# Patient Record
Sex: FEMALE
[De-identification: names, ages, dates, MRNs, and addresses within clinical notes are randomized per-mention and may not be internally consistent; named-entity substitution may affect disease eponyms.]

---

## 2018-09-27 ENCOUNTER — APPOINTMENT (OUTPATIENT)
Dept: OPHTHALMOLOGY | Facility: CLINIC | Age: 53
End: 2018-09-27
Payer: COMMERCIAL

## 2018-09-27 PROCEDURE — 92004 COMPRE OPH EXAM NEW PT 1/>: CPT

## 2018-09-27 PROCEDURE — 92225: CPT | Mod: LT

## 2020-01-07 ENCOUNTER — APPOINTMENT (OUTPATIENT)
Dept: OPHTHALMOLOGY | Facility: CLINIC | Age: 55
End: 2020-01-07

## 2021-09-29 ENCOUNTER — APPOINTMENT (OUTPATIENT)
Dept: OPHTHALMOLOGY | Facility: CLINIC | Age: 56
End: 2021-09-29

## 2022-02-28 ENCOUNTER — NON-APPOINTMENT (OUTPATIENT)
Age: 57
End: 2022-02-28

## 2022-02-28 ENCOUNTER — APPOINTMENT (OUTPATIENT)
Dept: OPHTHALMOLOGY | Facility: CLINIC | Age: 57
End: 2022-02-28
Payer: COMMERCIAL

## 2022-02-28 PROCEDURE — 92004 COMPRE OPH EXAM NEW PT 1/>: CPT

## 2022-02-28 PROCEDURE — 92250 FUNDUS PHOTOGRAPHY W/I&R: CPT

## 2022-05-16 ENCOUNTER — NON-APPOINTMENT (OUTPATIENT)
Age: 57
End: 2022-05-16

## 2022-05-16 ENCOUNTER — APPOINTMENT (OUTPATIENT)
Dept: OPHTHALMOLOGY | Facility: CLINIC | Age: 57
End: 2022-05-16
Payer: COMMERCIAL

## 2022-05-16 PROCEDURE — 92134 CPTRZ OPH DX IMG PST SGM RTA: CPT

## 2022-05-16 PROCEDURE — 92083 EXTENDED VISUAL FIELD XM: CPT

## 2022-05-16 PROCEDURE — 92012 INTRM OPH EXAM EST PATIENT: CPT

## 2024-02-22 ENCOUNTER — APPOINTMENT (OUTPATIENT)
Dept: SURGERY | Facility: CLINIC | Age: 59
End: 2024-02-22
Payer: COMMERCIAL

## 2024-02-22 VITALS
SYSTOLIC BLOOD PRESSURE: 146 MMHG | BODY MASS INDEX: 30.39 KG/M2 | HEIGHT: 64 IN | DIASTOLIC BLOOD PRESSURE: 92 MMHG | HEART RATE: 100 BPM | WEIGHT: 178 LBS

## 2024-02-22 DIAGNOSIS — Z80.8 FAMILY HISTORY OF MALIGNANT NEOPLASM OF OTHER ORGANS OR SYSTEMS: ICD-10-CM

## 2024-02-22 DIAGNOSIS — Z78.9 OTHER SPECIFIED HEALTH STATUS: ICD-10-CM

## 2024-02-22 DIAGNOSIS — Z86.79 PERSONAL HISTORY OF OTHER DISEASES OF THE CIRCULATORY SYSTEM: ICD-10-CM

## 2024-02-22 DIAGNOSIS — Z80.0 FAMILY HISTORY OF MALIGNANT NEOPLASM OF DIGESTIVE ORGANS: ICD-10-CM

## 2024-02-22 DIAGNOSIS — Z86.39 PERSONAL HISTORY OF OTHER ENDOCRINE, NUTRITIONAL AND METABOLIC DISEASE: ICD-10-CM

## 2024-02-22 PROCEDURE — 31575 DIAGNOSTIC LARYNGOSCOPY: CPT

## 2024-02-22 PROCEDURE — 99204 OFFICE O/P NEW MOD 45 MIN: CPT | Mod: 25

## 2024-02-22 RX ORDER — VENLAFAXINE 37.5 MG/1
TABLET ORAL
Refills: 0 | Status: ACTIVE | COMMUNITY

## 2024-02-22 RX ORDER — METOPROLOL TARTRATE 75 MG/1
TABLET, FILM COATED ORAL
Refills: 0 | Status: ACTIVE | COMMUNITY

## 2024-02-22 RX ORDER — SITAGLIPTIN AND METFORMIN HYDROCHLORIDE 50; 1000 MG/1; MG/1
50-1000 TABLET, FILM COATED ORAL
Refills: 0 | Status: ACTIVE | COMMUNITY

## 2024-02-22 RX ORDER — BUPROPION HYDROCHLORIDE 75 MG/1
TABLET, FILM COATED ORAL
Refills: 0 | Status: ACTIVE | COMMUNITY

## 2024-02-24 NOTE — CONSULT LETTER
[Dear  ___] : Dear  [unfilled], [Consult Letter:] : I had the pleasure of evaluating your patient, [unfilled]. [Please see my note below.] : Please see my note below. [Consult Closing:] : Thank you very much for allowing me to participate in the care of this patient.  If you have any questions, please do not hesitate to contact me. [Sincerely,] : Sincerely, [FreeTextEntry2] : Dr. Shravan Shah, Dr. Christopher Hdz [FreeTextEntry3] : Speedy Mann MD, FACS System Director, Endocrine Surgery [facility] Associate  Professor of Surgery [facility] [employer] at [facility] [DrNina  ___] : Dr. VILLA

## 2024-02-24 NOTE — PHYSICAL EXAM
[de-identified] : 6 cm left thyroid nodule, well circumscribed and mobile [Nasal Endoscopy Performed] : nasal endoscopy was performed, see procedure section for findings [Laryngoscopy Performed] : laryngoscopy was performed, see procedure section for findings [R] : deviated to the right [Normal] : orientation to person, place, and time: normal [de-identified] : fiberoptic laryngoscopy shows normal vocal cord mobility bilaterally with subglottic narrowing noted

## 2024-02-24 NOTE — ASSESSMENT
[FreeTextEntry1] : lengthy discussion regarding options for management including active surveillance  vs thyroid lobectomy with possible paratracheal node dissection, with or without frozen section vs total thyroidectomy with possible paratracheal node dissection.  risks, benefits and alternatives discussed at length.  I have discussed with the patient the anatomy of the area, the pathophysiology of the disease process and the rationale for surgery.  The attendant risks, possible complications and expected postoperative course have been discussed in detail.  I have given the patient the opportunity to ask questions, and all questions have been answered to the patient's satisfaction.  CT requested to assess tracheal narrowing and substernal extension.  will probably require lobectomy, possible total thyroidectomy, inpatient at [facility] with recurrent nerve monitoring. PCP has requested cardiac clearance. to call next week for results.

## 2024-02-24 NOTE — HISTORY OF PRESENT ILLNESS
[de-identified] : Pt c/o goiter for many years, increasing in size with occasional hoarseness. denies dysphagia, SOB or RT exposure sonogram: left 5.72  cm thyroid nodule FNA (2018) - Benign normal TFTs, calcium 9.5, vitamin D 35 I have reviewed all old and new data and available images.

## 2024-02-29 ENCOUNTER — OUTPATIENT (OUTPATIENT)
Dept: OUTPATIENT SERVICES | Facility: HOSPITAL | Age: 59
LOS: 1 days | End: 2024-02-29
Payer: COMMERCIAL

## 2024-02-29 ENCOUNTER — APPOINTMENT (OUTPATIENT)
Dept: CT IMAGING | Facility: CLINIC | Age: 59
End: 2024-02-29
Payer: COMMERCIAL

## 2024-02-29 DIAGNOSIS — E04.9 NONTOXIC GOITER, UNSPECIFIED: ICD-10-CM

## 2024-02-29 PROCEDURE — 70491 CT SOFT TISSUE NECK W/DYE: CPT

## 2024-02-29 PROCEDURE — 70491 CT SOFT TISSUE NECK W/DYE: CPT | Mod: 26

## 2024-03-05 DIAGNOSIS — E04.9 NONTOXIC GOITER, UNSPECIFIED: ICD-10-CM

## 2024-09-10 ENCOUNTER — APPOINTMENT (OUTPATIENT)
Dept: SURGERY | Facility: CLINIC | Age: 59
End: 2024-09-10
Payer: COMMERCIAL

## 2024-09-10 DIAGNOSIS — E04.9 NONTOXIC GOITER, UNSPECIFIED: ICD-10-CM

## 2024-09-10 PROCEDURE — 36415 COLL VENOUS BLD VENIPUNCTURE: CPT

## 2024-09-10 PROCEDURE — 99214 OFFICE O/P EST MOD 30 MIN: CPT | Mod: 25

## 2024-09-10 NOTE — ASSESSMENT
[FreeTextEntry1] : lengthy discussion regarding options for management including active surveillance  vs thyroid lobectomy with possible paratracheal node dissection, with or without frozen section vs total thyroidectomy with possible paratracheal node dissection.  risks, benefits and alternatives discussed at length.  I have discussed with the patient the anatomy of the area, the pathophysiology of the disease process and the rationale for surgery.  The attendant risks, possible complications and expected postoperative course have been discussed in detail.  I have given the patient the opportunity to ask questions, and all questions have been answered to the patient's satisfaction.  to be scheduled for substernal lobectomy, possible total thyroidectomy, inpatient at [facility] with recurrent nerve monitoring. PCP has requested cardiac clearance. to call next week for results.

## 2024-09-10 NOTE — PHYSICAL EXAM
[de-identified] : 6 cm left thyroid nodule, well circumscribed and mobile [Laryngoscopy Performed] : laryngoscopy was performed, see procedure section for findings [R] : deviated to the right [Normal] : orientation to person, place, and time: normal

## 2024-09-10 NOTE — HISTORY OF PRESENT ILLNESS
[de-identified] : prior evaluation of thyroid nodule. cytologically benign. substernal extension and tracheal compression. previously recommended surgery, but deferred until today. no changes medically since last visit. recent sonogram essentially stable. I have reviewed all old and new data and available images.

## 2024-09-11 LAB
T3 SERPL-MCNC: 111 NG/DL
T4 FREE SERPL-MCNC: 1 NG/DL
THYROGLOB AB SERPL-ACNC: 15.6 IU/ML
THYROPEROXIDASE AB SERPL IA-ACNC: 11 IU/ML
TSH SERPL-ACNC: 0.91 UIU/ML

## 2024-10-07 ENCOUNTER — OUTPATIENT (OUTPATIENT)
Dept: OUTPATIENT SERVICES | Facility: HOSPITAL | Age: 59
LOS: 1 days | End: 2024-10-07

## 2024-10-07 VITALS
SYSTOLIC BLOOD PRESSURE: 132 MMHG | HEIGHT: 63 IN | RESPIRATION RATE: 16 BRPM | TEMPERATURE: 98 F | WEIGHT: 173.94 LBS | HEART RATE: 85 BPM | DIASTOLIC BLOOD PRESSURE: 84 MMHG | OXYGEN SATURATION: 96 %

## 2024-10-07 DIAGNOSIS — E11.9 TYPE 2 DIABETES MELLITUS WITHOUT COMPLICATIONS: ICD-10-CM

## 2024-10-07 DIAGNOSIS — E04.9 NONTOXIC GOITER, UNSPECIFIED: ICD-10-CM

## 2024-10-07 DIAGNOSIS — F41.9 ANXIETY DISORDER, UNSPECIFIED: ICD-10-CM

## 2024-10-07 DIAGNOSIS — Z86.39 PERSONAL HISTORY OF OTHER ENDOCRINE, NUTRITIONAL AND METABOLIC DISEASE: ICD-10-CM

## 2024-10-07 DIAGNOSIS — Z98.890 OTHER SPECIFIED POSTPROCEDURAL STATES: Chronic | ICD-10-CM

## 2024-10-07 DIAGNOSIS — I10 ESSENTIAL (PRIMARY) HYPERTENSION: ICD-10-CM

## 2024-10-07 DIAGNOSIS — Z90.11 ACQUIRED ABSENCE OF RIGHT BREAST AND NIPPLE: Chronic | ICD-10-CM

## 2024-10-07 LAB
A1C WITH ESTIMATED AVERAGE GLUCOSE RESULT: 8.4 % — HIGH (ref 4–5.6)
BLD GP AB SCN SERPL QL: NEGATIVE — SIGNIFICANT CHANGE UP
ESTIMATED AVERAGE GLUCOSE: 194 — SIGNIFICANT CHANGE UP
RH IG SCN BLD-IMP: POSITIVE — SIGNIFICANT CHANGE UP
RH IG SCN BLD-IMP: POSITIVE — SIGNIFICANT CHANGE UP

## 2024-10-07 RX ORDER — SODIUM CHLORIDE IRRIG SOLUTION 0.9 %
1000 SOLUTION, IRRIGATION IRRIGATION
Refills: 0 | Status: DISCONTINUED | OUTPATIENT
Start: 2024-10-14 | End: 2024-10-14

## 2024-10-07 NOTE — H&P PST ADULT - PROBLEM SELECTOR PLAN 1
scheduled for left thyroid substernal lobectomy, possible total thyroidectomy, recurrent nerve monitoring with Dr. Mann on 10/14/2024.   Verbal and written pre-op instructions provided to patient. Patient verbalized understanding and will call surgeons office for revised instructions if surgery is rescheduled.  Pepcid for GI prophylaxis provided.   patient given verbal and written instruction with teach back on chlorhexidine shampoo, and the patient verbalized understanding with return demonstration.

## 2024-10-07 NOTE — H&P PST ADULT - PROBLEM SELECTOR PLAN 2
Pt. instructed to hold Janumet  the morning of procedure. Accucheck DOS.   Last dose of Jardiance on 10/10/24.  Patient will obtain medical clearance as per surgeons request-copy requested for A1c of 8 Pt. instructed to hold Janumet  the morning of procedure. Accucheck DOS.   Last dose of Jardiance on 10/10/24. Pt. instructed to hold Janumet  the morning of procedure. Accucheck DOS.   Last dose of Jardiance on 10/10/24.  Surgeon notified via email of last A1c 8.

## 2024-10-07 NOTE — H&P PST ADULT - NSICDXPASTMEDICALHX_GEN_ALL_CORE_FT
PAST MEDICAL HISTORY:  Depression     Hyperlipidemia     Hypertension     Type 2 diabetes mellitus

## 2024-10-07 NOTE — H&P PST ADULT - ASSESSMENT
patient 58 yo female presents to PST unit with pre-op diagnosis of nontoxic goiter scheduled for left thyroid substernal lobectomy, possible total thyroidectomy, recurrent nerve monitoring with Dr. Mann.

## 2024-10-07 NOTE — H&P PST ADULT - ENMT COMMENTS
large goiter large goiter for several years large goiter, mallampati: II large goiter for several years, denies dentures and  loose teeth large goiter, Mallampati: II

## 2024-10-07 NOTE — H&P PST ADULT - HISTORY OF PRESENT ILLNESS
60 yo female presents to PST unit with pre-op diagnosis of nontoxic goiter scheduled for left thyroid substernal lobectomy, possible total thyroidectomy, recurrent nerve monitoring with Dr. Mann. Pt. reports enlarging goiter over the past several years.

## 2024-10-07 NOTE — H&P PST ADULT - PRO ARRIVE FROM
Patient feeling much better discussed supportive management of hydrations and motrin/tylenol use. Advised follow up with PMD and discussed strict return precautions.
home

## 2024-10-08 LAB
BASOPHILS # BLD AUTO: 0.07 K/UL — SIGNIFICANT CHANGE UP (ref 0–0.2)
BASOPHILS NFR BLD AUTO: 0.6 % — SIGNIFICANT CHANGE UP (ref 0–2)
EOSINOPHIL # BLD AUTO: 0.15 K/UL — SIGNIFICANT CHANGE UP (ref 0–0.5)
EOSINOPHIL NFR BLD AUTO: 1.4 % — SIGNIFICANT CHANGE UP (ref 0–6)
HCT VFR BLD CALC: 41.4 % — SIGNIFICANT CHANGE UP (ref 34.5–45)
HGB BLD-MCNC: 13.3 G/DL — SIGNIFICANT CHANGE UP (ref 11.5–15.5)
IMM GRANULOCYTES NFR BLD AUTO: 0.4 % — SIGNIFICANT CHANGE UP (ref 0–0.9)
LYMPHOCYTES # BLD AUTO: 2.8 K/UL — SIGNIFICANT CHANGE UP (ref 1–3.3)
LYMPHOCYTES # BLD AUTO: 26 % — SIGNIFICANT CHANGE UP (ref 13–44)
MCHC RBC-ENTMCNC: 26.2 PG — LOW (ref 27–34)
MCHC RBC-ENTMCNC: 32.1 GM/DL — SIGNIFICANT CHANGE UP (ref 32–36)
MCV RBC AUTO: 81.5 FL — SIGNIFICANT CHANGE UP (ref 80–100)
MONOCYTES # BLD AUTO: 0.98 K/UL — HIGH (ref 0–0.9)
MONOCYTES NFR BLD AUTO: 9.1 % — SIGNIFICANT CHANGE UP (ref 2–14)
NEUTROPHILS # BLD AUTO: 6.73 K/UL — SIGNIFICANT CHANGE UP (ref 1.8–7.4)
NEUTROPHILS NFR BLD AUTO: 62.5 % — SIGNIFICANT CHANGE UP (ref 43–77)
PLATELET # BLD AUTO: 250 K/UL — SIGNIFICANT CHANGE UP (ref 150–400)
RBC # BLD: 5.08 M/UL — SIGNIFICANT CHANGE UP (ref 3.8–5.2)
RBC # FLD: 15 % — HIGH (ref 10.3–14.5)
WBC # BLD: 10.77 K/UL — HIGH (ref 3.8–10.5)
WBC # FLD AUTO: 10.77 K/UL — HIGH (ref 3.8–10.5)

## 2024-10-11 NOTE — ASU PATIENT PROFILE, ADULT - FALL HARM RISK - UNIVERSAL INTERVENTIONS
Bed in lowest position, wheels locked, appropriate side rails in place/Call bell, personal items and telephone in reach/Instruct patient to call for assistance before getting out of bed or chair/Non-slip footwear when patient is out of bed/[place] to call system/Physically safe environment - no spills, clutter or unnecessary equipment/Purposeful Proactive Rounding/Room/bathroom lighting operational, light cord in reach

## 2024-10-11 NOTE — ASU PATIENT PROFILE, ADULT - PATIENT'S SEXUAL ORIENTATION
Problem: Patient Care Overview  Goal: Plan of Care Review  Outcome: Ongoing (interventions implemented as appropriate)  VSS. Pain controlled with PRN and scheduled oral pain medication. Pt wearing abdominal binder. RN providing heat packs for pt's lower back soreness near epidural site. Incision c/d/i. No dilaudid administered this shift. Pt able to sleep during shift after administration of 10 mg ambien PO. RN emphasized importance for pt to walk around unit to decrease muscle stiffness and pain. Pt verbalizes understanding, but only walked from bed to bathroom and back during shift. Pt safety maintained. Will continue to monitor.        Heterosexual

## 2024-10-14 ENCOUNTER — TRANSCRIPTION ENCOUNTER (OUTPATIENT)
Age: 59
End: 2024-10-14

## 2024-10-14 ENCOUNTER — RESULT REVIEW (OUTPATIENT)
Age: 59
End: 2024-10-14

## 2024-10-14 ENCOUNTER — APPOINTMENT (OUTPATIENT)
Dept: SURGERY | Facility: HOSPITAL | Age: 59
End: 2024-10-14
Payer: COMMERCIAL

## 2024-10-14 ENCOUNTER — INPATIENT (INPATIENT)
Facility: HOSPITAL | Age: 59
LOS: 0 days | Discharge: HOME | End: 2024-10-15
Attending: SPECIALIST | Admitting: SPECIALIST
Payer: COMMERCIAL

## 2024-10-14 VITALS
RESPIRATION RATE: 16 BRPM | SYSTOLIC BLOOD PRESSURE: 137 MMHG | HEIGHT: 63 IN | TEMPERATURE: 97 F | OXYGEN SATURATION: 98 % | WEIGHT: 173.94 LBS | DIASTOLIC BLOOD PRESSURE: 91 MMHG | HEART RATE: 83 BPM

## 2024-10-14 DIAGNOSIS — E04.9 NONTOXIC GOITER, UNSPECIFIED: ICD-10-CM

## 2024-10-14 DIAGNOSIS — Z90.11 ACQUIRED ABSENCE OF RIGHT BREAST AND NIPPLE: Chronic | ICD-10-CM

## 2024-10-14 DIAGNOSIS — Z98.890 OTHER SPECIFIED POSTPROCEDURAL STATES: Chronic | ICD-10-CM

## 2024-10-14 LAB
GLUCOSE BLDC GLUCOMTR-MCNC: 273 MG/DL — HIGH (ref 70–99)
GLUCOSE BLDC GLUCOMTR-MCNC: 288 MG/DL — HIGH (ref 70–99)
GLUCOSE BLDC GLUCOMTR-MCNC: 337 MG/DL — HIGH (ref 70–99)
GLUCOSE BLDC GLUCOMTR-MCNC: 347 MG/DL — HIGH (ref 70–99)

## 2024-10-14 PROCEDURE — 88334 PATH CONSLTJ SURG CYTO XM EA: CPT | Mod: 26,59

## 2024-10-14 PROCEDURE — 88307 TISSUE EXAM BY PATHOLOGIST: CPT | Mod: 26

## 2024-10-14 PROCEDURE — 60271 REMOVAL OF THYROID: CPT

## 2024-10-14 PROCEDURE — 88331 PATH CONSLTJ SURG 1 BLK 1SPC: CPT | Mod: 26

## 2024-10-14 PROCEDURE — 13132 CMPLX RPR F/C/C/M/N/AX/G/H/F: CPT | Mod: 59

## 2024-10-14 DEVICE — LIGATING CLIPS WECK HORIZON LARGE (ORANGE) 24: Type: IMPLANTABLE DEVICE | Status: FUNCTIONAL

## 2024-10-14 DEVICE — ENDOTRACHEAL TUBE ENT KIT 30FR 7MM ID - 10MM OD: Type: IMPLANTABLE DEVICE | Status: FUNCTIONAL

## 2024-10-14 DEVICE — LIGATING CLIPS WECK HORIZON MEDIUM (BLUE) 24: Type: IMPLANTABLE DEVICE | Status: FUNCTIONAL

## 2024-10-14 DEVICE — LIGATING CLIPS WECK HORIZON SMALL-WIDE (RED) 24: Type: IMPLANTABLE DEVICE | Status: FUNCTIONAL

## 2024-10-14 DEVICE — SURGICEL FIBRILLAR 2 X 4": Type: IMPLANTABLE DEVICE | Status: FUNCTIONAL

## 2024-10-14 RX ORDER — METFORMIN HCL 500 MG
500 TABLET ORAL ONCE
Refills: 0 | Status: COMPLETED | OUTPATIENT
Start: 2024-10-15 | End: 2024-10-15

## 2024-10-14 RX ORDER — INSULIN LISPRO 100/ML
VIAL (ML) SUBCUTANEOUS
Refills: 0 | Status: DISCONTINUED | OUTPATIENT
Start: 2024-10-14 | End: 2024-10-15

## 2024-10-14 RX ORDER — VENLAFAXINE HCL 75 MG
150 TABLET ORAL DAILY
Refills: 0 | Status: DISCONTINUED | OUTPATIENT
Start: 2024-10-14 | End: 2024-10-14

## 2024-10-14 RX ORDER — LINAGLIPTIN 5 MG/1
5 TABLET, FILM COATED ORAL DAILY
Refills: 0 | Status: DISCONTINUED | OUTPATIENT
Start: 2024-10-14 | End: 2024-10-15

## 2024-10-14 RX ORDER — SITAGLIPTIN AND METFORMIN HYDROCHLORIDE 500; 50 MG/1; MG/1
1 TABLET, FILM COATED ORAL
Refills: 0 | DISCHARGE

## 2024-10-14 RX ORDER — ACETAMINOPHEN 325 MG
2 TABLET ORAL
Qty: 0 | Refills: 0 | DISCHARGE
Start: 2024-10-14

## 2024-10-14 RX ORDER — FENTANYL CITRATE-0.9 % NACL/PF 300MCG/30
50 PATIENT CONTROLLED ANALGESIA VIAL INJECTION
Refills: 0 | Status: DISCONTINUED | OUTPATIENT
Start: 2024-10-14 | End: 2024-10-14

## 2024-10-14 RX ORDER — EMPAGLIFLOZIN 25 MG/1
1 TABLET, FILM COATED ORAL
Refills: 0 | DISCHARGE

## 2024-10-14 RX ORDER — LINAGLIPTIN 5 MG/1
5 TABLET, FILM COATED ORAL DAILY
Refills: 0 | Status: DISCONTINUED | OUTPATIENT
Start: 2024-10-14 | End: 2024-10-14

## 2024-10-14 RX ORDER — SODIUM CHLORIDE IRRIG SOLUTION 0.9 %
1000 SOLUTION, IRRIGATION IRRIGATION
Refills: 0 | Status: DISCONTINUED | OUTPATIENT
Start: 2024-10-14 | End: 2024-10-15

## 2024-10-14 RX ORDER — ALCOHOL ANTISEPTIC PADS
15 PADS, MEDICATED (EA) TOPICAL ONCE
Refills: 0 | Status: DISCONTINUED | OUTPATIENT
Start: 2024-10-14 | End: 2024-10-15

## 2024-10-14 RX ORDER — ATORVASTATIN CALCIUM 10 MG/1
10 TABLET, FILM COATED ORAL AT BEDTIME
Refills: 0 | Status: DISCONTINUED | OUTPATIENT
Start: 2024-10-14 | End: 2024-10-14

## 2024-10-14 RX ORDER — ATORVASTATIN CALCIUM 10 MG/1
10 TABLET, FILM COATED ORAL AT BEDTIME
Refills: 0 | Status: DISCONTINUED | OUTPATIENT
Start: 2024-10-14 | End: 2024-10-15

## 2024-10-14 RX ORDER — ALCOHOL ANTISEPTIC PADS
15 PADS, MEDICATED (EA) TOPICAL ONCE
Refills: 0 | Status: DISCONTINUED | OUTPATIENT
Start: 2024-10-14 | End: 2024-10-14

## 2024-10-14 RX ORDER — OXYCODONE HYDROCHLORIDE 30 MG/1
5 TABLET, FILM COATED, EXTENDED RELEASE ORAL EVERY 6 HOURS
Refills: 0 | Status: DISCONTINUED | OUTPATIENT
Start: 2024-10-14 | End: 2024-10-15

## 2024-10-14 RX ORDER — METFORMIN HCL 500 MG
500 TABLET ORAL DAILY
Refills: 0 | Status: DISCONTINUED | OUTPATIENT
Start: 2024-10-14 | End: 2024-10-14

## 2024-10-14 RX ORDER — ALCOHOL ANTISEPTIC PADS
25 PADS, MEDICATED (EA) TOPICAL ONCE
Refills: 0 | Status: DISCONTINUED | OUTPATIENT
Start: 2024-10-14 | End: 2024-10-15

## 2024-10-14 RX ORDER — INSULIN LISPRO 100/ML
VIAL (ML) SUBCUTANEOUS
Refills: 0 | Status: DISCONTINUED | OUTPATIENT
Start: 2024-10-14 | End: 2024-10-14

## 2024-10-14 RX ORDER — SODIUM CHLORIDE IRRIG SOLUTION 0.9 %
1000 SOLUTION, IRRIGATION IRRIGATION
Refills: 0 | Status: DISCONTINUED | OUTPATIENT
Start: 2024-10-14 | End: 2024-10-14

## 2024-10-14 RX ORDER — OXYCODONE HYDROCHLORIDE 30 MG/1
5 TABLET, FILM COATED, EXTENDED RELEASE ORAL EVERY 6 HOURS
Refills: 0 | Status: DISCONTINUED | OUTPATIENT
Start: 2024-10-14 | End: 2024-10-14

## 2024-10-14 RX ORDER — SIMVASTATIN 20 MG
1 TABLET ORAL
Refills: 0 | DISCHARGE

## 2024-10-14 RX ORDER — VENLAFAXINE HCL 75 MG
150 TABLET ORAL DAILY
Refills: 0 | Status: DISCONTINUED | OUTPATIENT
Start: 2024-10-14 | End: 2024-10-15

## 2024-10-14 RX ORDER — ALCOHOL ANTISEPTIC PADS
25 PADS, MEDICATED (EA) TOPICAL ONCE
Refills: 0 | Status: DISCONTINUED | OUTPATIENT
Start: 2024-10-14 | End: 2024-10-14

## 2024-10-14 RX ORDER — METOPROLOL TARTRATE 50 MG
25 TABLET ORAL DAILY
Refills: 0 | Status: DISCONTINUED | OUTPATIENT
Start: 2024-10-14 | End: 2024-10-14

## 2024-10-14 RX ORDER — METOPROLOL TARTRATE 50 MG
1 TABLET ORAL
Refills: 0 | DISCHARGE

## 2024-10-14 RX ORDER — BENZOCAINE AND LEVOMENTHOL 200; 5 MG/G; MG/G
1 SPRAY TOPICAL
Refills: 0 | Status: DISCONTINUED | OUTPATIENT
Start: 2024-10-14 | End: 2024-10-15

## 2024-10-14 RX ORDER — GLUCAGON INJECTION, SOLUTION 0.5 MG/.1ML
1 INJECTION, SOLUTION SUBCUTANEOUS ONCE
Refills: 0 | Status: DISCONTINUED | OUTPATIENT
Start: 2024-10-14 | End: 2024-10-15

## 2024-10-14 RX ORDER — GLUCAGON INJECTION, SOLUTION 0.5 MG/.1ML
1 INJECTION, SOLUTION SUBCUTANEOUS ONCE
Refills: 0 | Status: DISCONTINUED | OUTPATIENT
Start: 2024-10-14 | End: 2024-10-14

## 2024-10-14 RX ORDER — BENZOCAINE AND LEVOMENTHOL 200; 5 MG/G; MG/G
1 SPRAY TOPICAL
Refills: 0 | Status: DISCONTINUED | OUTPATIENT
Start: 2024-10-14 | End: 2024-10-14

## 2024-10-14 RX ORDER — VENLAFAXINE HCL 75 MG
1 TABLET ORAL
Refills: 0 | DISCHARGE

## 2024-10-14 RX ORDER — ACETAMINOPHEN 325 MG
1000 TABLET ORAL EVERY 6 HOURS
Refills: 0 | Status: DISCONTINUED | OUTPATIENT
Start: 2024-10-14 | End: 2024-10-14

## 2024-10-14 RX ORDER — ALCOHOL ANTISEPTIC PADS
12.5 PADS, MEDICATED (EA) TOPICAL ONCE
Refills: 0 | Status: DISCONTINUED | OUTPATIENT
Start: 2024-10-14 | End: 2024-10-15

## 2024-10-14 RX ORDER — ACETAMINOPHEN 325 MG
1000 TABLET ORAL EVERY 6 HOURS
Refills: 0 | Status: DISCONTINUED | OUTPATIENT
Start: 2024-10-14 | End: 2024-10-15

## 2024-10-14 RX ORDER — ALCOHOL ANTISEPTIC PADS
12.5 PADS, MEDICATED (EA) TOPICAL ONCE
Refills: 0 | Status: DISCONTINUED | OUTPATIENT
Start: 2024-10-14 | End: 2024-10-14

## 2024-10-14 RX ORDER — METOPROLOL TARTRATE 50 MG
25 TABLET ORAL DAILY
Refills: 0 | Status: DISCONTINUED | OUTPATIENT
Start: 2024-10-14 | End: 2024-10-15

## 2024-10-14 RX ADMIN — Medication 30 MILLILITER(S): at 12:51

## 2024-10-14 RX ADMIN — LINAGLIPTIN 5 MILLIGRAM(S): 5 TABLET, FILM COATED ORAL at 13:42

## 2024-10-14 RX ADMIN — Medication 4: at 23:05

## 2024-10-14 RX ADMIN — Medication 25 MILLIGRAM(S): at 18:19

## 2024-10-14 RX ADMIN — Medication 1000 MILLIGRAM(S): at 19:09

## 2024-10-14 RX ADMIN — Medication 4: at 15:51

## 2024-10-14 RX ADMIN — Medication 500 MILLIGRAM(S): at 14:37

## 2024-10-14 RX ADMIN — Medication 1000 MILLIGRAM(S): at 18:27

## 2024-10-14 RX ADMIN — Medication 1 MILLIGRAM(S): at 22:18

## 2024-10-14 RX ADMIN — Medication 150 MILLIGRAM(S): at 18:20

## 2024-10-14 RX ADMIN — ATORVASTATIN CALCIUM 10 MILLIGRAM(S): 10 TABLET, FILM COATED ORAL at 22:18

## 2024-10-14 NOTE — PROGRESS NOTE ADULT - SUBJECTIVE AND OBJECTIVE BOX
Surgery Post-Op Note    Pre-Op Dx: Nontoxic goiter  Procedure: Thyroid lobectomy, total, left, with isthmusectomy  Surgeon: Dr. Mann    SUBJECTIVE: Pt seen and examined at the bedside. Pt w/ no complaints. Denies F/C/N/V. Pain controlled with medication.     OBJECTIVE:  Vital Signs Last 24 Hrs  T(C): 36.5 (14 Oct 2024 11:30), Max: 36.5 (14 Oct 2024 11:30)  T(F): 97.7 (14 Oct 2024 11:30), Max: 97.7 (14 Oct 2024 11:30)  HR: 82 (14 Oct 2024 14:30) (70 - 83)  BP: 131/83 (14 Oct 2024 14:30) (120/74 - 137/91)  BP(mean): 98 (14 Oct 2024 12:30) (96 - 98)  RR: 16 (14 Oct 2024 14:30) (15 - 18)  SpO2: 94% (14 Oct 2024 14:30) (94% - 98%)    Parameters below as of 14 Oct 2024 08:45  Patient On (Oxygen Delivery Method): mask, simple face        Physical Exam:  General: NAD, resting comfortably in bed  Neuro: A&O x 3, no focal deficits  Neck: Flat, soft, incision C/D/I. DESTINY drain serosanguinous.  Pulmonary: Nonlabored breathing, no respiratory distress  Cardiovascular: Well perfused  Abdominal: soft, nondistended, NTTP. No rebound or guarding.   Extremities: WWP    CAPILLARY BLOOD GLUCOSE      POCT Blood Glucose.: 347 mg/dL (14 Oct 2024 15:12)  POCT Blood Glucose.: 288 mg/dL (14 Oct 2024 12:25)  POCT Blood Glucose.: 273 mg/dL (14 Oct 2024 08:03)        ASSESSMENT:59y Female now 4hours s/p left substernal thyroid lobectomy with isthmusectomy. Recovering appropriately.    PLAN:  - Pain control Tylenol, oxycodone PRN  - OOB/ Ambulate  - Encourage IS 10x/hour while in bed  - LR @30  - Diet: Advance to regular as tolerated  - DM on home metformin, Tradjenta, ISS with FS ACHS  - DVT ppx: SCDS ONLY    D Team Surgery  [id number]        Surgery Post-Op Note    Pre-Op Dx: Nontoxic goiter  Procedure: Thyroid lobectomy, total, left, with isthmusectomy  Surgeon: Dr. Mann    SUBJECTIVE: Pt seen and examined at the bedside. Patient is doing well, and reports that her pain is well controlled. Tolerating liquids, ambulating, and has voided. Has a mild headache. Reports dizziness when she first began ambulating, which has resolved. Denies fever, chills, nausea, vomiting, diarrhea, chest pain, and SOB.     OBJECTIVE:  Vital Signs Last 24 Hrs  T(C): 36.5 (14 Oct 2024 11:30), Max: 36.5 (14 Oct 2024 11:30)  T(F): 97.7 (14 Oct 2024 11:30), Max: 97.7 (14 Oct 2024 11:30)  HR: 82 (14 Oct 2024 14:30) (70 - 83)  BP: 131/83 (14 Oct 2024 14:30) (120/74 - 137/91)  BP(mean): 98 (14 Oct 2024 12:30) (96 - 98)  RR: 16 (14 Oct 2024 14:30) (15 - 18)  SpO2: 94% (14 Oct 2024 14:30) (94% - 98%)    Parameters below as of 14 Oct 2024 08:45  Patient On (Oxygen Delivery Method): mask, simple face        Physical Exam:  General: NAD, resting comfortably in bed  Neuro: A&O x 3, no focal deficits  Neck: Flat, soft, incision C/D/I. DESTINY drain serosanguinous.  Pulmonary: Nonlabored breathing, no respiratory distress  Cardiovascular: Well perfused  Abdominal: soft, nondistended, NTTP. No rebound or guarding.   Extremities: WWP    CAPILLARY BLOOD GLUCOSE      POCT Blood Glucose.: 347 mg/dL (14 Oct 2024 15:12)  POCT Blood Glucose.: 288 mg/dL (14 Oct 2024 12:25)  POCT Blood Glucose.: 273 mg/dL (14 Oct 2024 08:03)        ASSESSMENT: The patient is a 59-year-old female with a past medical history of depression, HLD, Hypertension, and DM who presents with nontoxic goiter and is now s/p Left Substernal Thyroid Lobectomy with Isthmusectomy and RLN monitoring. Recovering appropriately.    PLAN:  - Pain control Tylenol, oxycodone PRN  - OOB/ Ambulate  - Encourage IS 10x/hour while in bed  - LR @30  - Diet: Advance to regular as tolerated  - DM on home metformin, Tradjenta, ISS with FS ACHS  - DVT ppx: SCDS ONLY    D Team Surgery  [id number]

## 2024-10-14 NOTE — BRIEF OPERATIVE NOTE - NSICDXBRIEFPROCEDURE_GEN_ALL_CORE_FT
PROCEDURES:  Thyroid lobectomy, total, left, with isthmusectomy 14-Oct-2024 11:35:37  [facility], Antoine   PROCEDURES:  Thyroid lobectomy, total, left, with isthmusectomy 14-Oct-2024 11:35:37 Left Thyroid Substernal Lobectomy with isthmusectomy [facility], Antoine   PROCEDURES:  Thyroid lobectomy, total, left, with isthmusectomy 14-Oct-2024 11:35:37 Left Thyroid Substernal Lobectomy with isthmusectomy with RLN monitoring [facility], Antoine

## 2024-10-14 NOTE — DISCHARGE NOTE PROVIDER - NSDCMRMEDTOKEN_GEN_ALL_CORE_FT
acetaminophen 500 mg oral tablet: 2 tab(s) orally every 6 hours As needed Temp greater or equal to 38.5C (101.3F), Moderate Pain (4 - 6)  buPROPion 300 mg/24 hours (XL) oral tablet, extended release: 1 tab(s) orally once a day  Janumet 50 mg-500 mg oral tablet: 1 tab(s) orally 2 times a day  Jardiance 10 mg oral tablet: 1 tab(s) orally once a day  metoprolol succinate 25 mg oral tablet, extended release: 1 tab(s) orally once a day  risperiDONE 1 mg oral tablet: 1 tab(s) orally once a day (at bedtime)  simvastatin 20 mg oral tablet: 1 tab(s) orally once a day (at bedtime)  venlafaxine 150 mg oral tablet, extended release: 1 tab(s) orally once a day

## 2024-10-14 NOTE — DISCHARGE NOTE PROVIDER - CARE PROVIDER_API CALL
Speedy Mann  Surgery  [address] [place] [address] [address]  [place] [address]  Phone: [phone]  Fax: [phone]  Follow Up Time:

## 2024-10-14 NOTE — BRIEF OPERATIVE NOTE - OPERATION/FINDINGS
Dissected through subcutaneous and platysma layer. Made vertical incision through cervical fascia, dissected through sternohyoid, omohyoid, and sternothyroid muscles. Identified and mobilized left thyroid, ligated vessels with sutures and placed clips. Removed left thyroid with isthmus, and attained hemostasis. Placed DESTINY drain, sutured closed cervical fascia and platysma fascia with chromic gut. Closed skin with monocryl.

## 2024-10-14 NOTE — PATIENT PROFILE ADULT - FALL HARM RISK - HARM RISK INTERVENTIONS

## 2024-10-15 ENCOUNTER — TRANSCRIPTION ENCOUNTER (OUTPATIENT)
Age: 59
End: 2024-10-15

## 2024-10-15 VITALS — WEIGHT: 173.94 LBS

## 2024-10-15 LAB — GLUCOSE BLDC GLUCOMTR-MCNC: 287 MG/DL — HIGH (ref 70–99)

## 2024-10-15 RX ADMIN — Medication 500 MILLIGRAM(S): at 05:43

## 2024-10-15 RX ADMIN — Medication 3: at 08:43

## 2024-10-15 RX ADMIN — Medication 300 MILLIGRAM(S): at 05:43

## 2024-10-15 NOTE — DISCHARGE NOTE NURSING/CASE MANAGEMENT/SOCIAL WORK - PATIENT PORTAL LINK FT
[No Acute Distress] : no acute distress [Normal] : affect was normal and insight and judgment were intact [de-identified] : hand with FROM [de-identified] : wound looks excellent, healthy tissue, no discharge You can access the FollowMyHealth Patient Portal offered by [facility] by registering at the following website: [email or web address]. By joining [employer]’s FollowMyHealth portal, you will also be able to view your health information using other applications (apps) compatible with our system.

## 2024-10-15 NOTE — PROGRESS NOTE ADULT - ASSESSMENT
ASSESSMENT: The patient is a 59-year-old female with a past medical history of depression, HLD, Hypertension, and DM who presents with nontoxic goiter and is now s/p Left Substernal Thyroid Lobectomy with Isthmusectomy and RLN monitoring. Recovering appropriately.    PLAN:  - Pain control Tylenol, oxycodone PRN  - OOB/ Ambulate  - Encourage IS 10x/hour while in bed  - Diet: Reg  - DM on home metformin, Tradjenta, ISS with FS ACHS  - DVT ppx: SCDS ONLY  - Endocrine consult  - Dispo planning    D Team Surgery  [id number]  ASSESSMENT: The patient is a 59-year-old female with a past medical history of depression, HLD, Hypertension, and DM who presents with nontoxic goiter and is now POD #1 Left Substernal Thyroid Lobectomy with Isthmusectomy and RLN monitoring. Recovering appropriately.    PLAN:  - Pain control Tylenol, oxycodone PRN  - OOB/ Ambulate  - Encourage IS 10x/hour while in bed  - Diet: Reg  - DM on home metformin, Tradjenta, ISS with FS ACHS  - DVT ppx: SCDS ONLY  - Endocrine consult  - D/c DESTINY drain  - Dispo planning    D Team Surgery  [id number]

## 2024-10-15 NOTE — DIETITIAN INITIAL EVALUATION ADULT - PERTINENT LABORATORY DATA
POCT Blood Glucose.: 287 mg/dL (10-15-24 @ 08:34)  A1C with Estimated Average Glucose Result: 8.4 % (10-07-24 @ 19:23)

## 2024-10-15 NOTE — DIETITIAN INITIAL EVALUATION ADULT - PERTINENT MEDS FT
MEDICATIONS  (STANDING):  atorvastatin 10 milliGRAM(s) Oral at bedtime  dextrose 5%. 1000 milliLiter(s) (50 mL/Hr) IV Continuous <Continuous>  dextrose 50% Injectable 25 Gram(s) IV Push once  dextrose 50% Injectable 12.5 Gram(s) IV Push once  dextrose 50% Injectable 25 Gram(s) IV Push once  glucagon  Injectable 1 milliGRAM(s) IntraMuscular once  insulin lispro (ADMELOG) corrective regimen sliding scale   SubCutaneous Before meals and at bedtime  linagliptin 5 milliGRAM(s) Oral daily  metoprolol succinate ER 25 milliGRAM(s) Oral daily  risperiDONE  Disintegrating Tablet 1 milliGRAM(s) Oral at bedtime  venlafaxine XR. 150 milliGRAM(s) Oral daily    MEDICATIONS  (PRN):  acetaminophen     Tablet .. 1000 milliGRAM(s) Oral every 6 hours PRN Temp greater or equal to 38.5C (101.3F), Moderate Pain (4 - 6)  benzocaine/menthol Lozenge 1 Lozenge Oral every 2 hours PRN Sore Throat  dextrose Oral Gel 15 Gram(s) Oral once PRN Blood Glucose LESS THAN 70 milliGRAM(s)/deciliter  oxyCODONE    IR 5 milliGRAM(s) Oral every 6 hours PRN Severe Pain (7 - 10)

## 2024-10-15 NOTE — PROGRESS NOTE ADULT - SUBJECTIVE AND OBJECTIVE BOX
1 day s/p thyroid lobectomy. afebrile. no collections. now taking adequate po. DESTINY removed. discharge home.  f/u in office

## 2024-10-15 NOTE — PROGRESS NOTE ADULT - SUBJECTIVE AND OBJECTIVE BOX
TEAM D Surgery Daily Progress Note  =====================================================    Overnight: NAEO    SUBJECTIVE: Patient seen and examined at bedside on AM rounds. The patient reports that they are feeling well and that their pain is well controlled. Tolerating diet, ambulating well. Glucose levels were > 330 overnight for two readings with sliding scale. Patient denies fever, chills, dizziness, nausea, vomiting, diarrhea, chest pain, and SOB.   Bulb - 90 mL, serosanguineous     PMH:   PAST MEDICAL & SURGICAL HISTORY:  Type 2 diabetes mellitus      Hypertension      Hyperlipidemia      Depression      H/O right mastectomy      S/P foot surgery, left          ALLERGIES:  No Known Allergies      --------------------------------------------------------------------------------------    MEDICATIONS:    Neurologic Medications  acetaminophen     Tablet .. 1000 milliGRAM(s) Oral every 6 hours PRN Temp greater or equal to 38.5C (101.3F), Moderate Pain (4 - 6)  oxyCODONE    IR 5 milliGRAM(s) Oral every 6 hours PRN Severe Pain (7 - 10)  risperiDONE  Disintegrating Tablet 1 milliGRAM(s) Oral at bedtime  venlafaxine XR. 150 milliGRAM(s) Oral daily    Respiratory Medications    Cardiovascular Medications  metoprolol succinate ER 25 milliGRAM(s) Oral daily    Gastrointestinal Medications  dextrose 5%. 1000 milliLiter(s) IV Continuous <Continuous>    Genitourinary Medications    Hematologic/Oncologic Medications    Antimicrobial/Immunologic Medications    Endocrine/Metabolic Medications  atorvastatin 10 milliGRAM(s) Oral at bedtime  dextrose 50% Injectable 25 Gram(s) IV Push once  dextrose 50% Injectable 12.5 Gram(s) IV Push once  dextrose 50% Injectable 25 Gram(s) IV Push once  dextrose Oral Gel 15 Gram(s) Oral once PRN Blood Glucose LESS THAN 70 milliGRAM(s)/deciliter  glucagon  Injectable 1 milliGRAM(s) IntraMuscular once  insulin lispro (ADMELOG) corrective regimen sliding scale   SubCutaneous Before meals and at bedtime  linagliptin 5 milliGRAM(s) Oral daily    Topical/Other Medications  benzocaine/menthol Lozenge 1 Lozenge Oral every 2 hours PRN Sore Throat    --------------------------------------------------------------------------------------    VITAL SIGNS:    T(C): 36.9 (10-15-24 @ 05:43), Max: 37.1 (10-14-24 @ 22:00)  HR: 81 (10-15-24 @ 05:43) (70 - 88)  BP: 137/86 (10-15-24 @ 05:43) (120/74 - 139/87)  RR: 17 (10-15-24 @ 05:43) (15 - 18)  SpO2: 99% (10-15-24 @ 05:43) (94% - 100%)    --------------------------------------------------------------------------------------    EXAM    General: NAD, resting in bed comfortably.  Cardiac: regular rate, warm and well perfused  Respiratory: Nonlabored respirations, normal cw expansion.  Neck: soft, no swelling or discoloration, steri-strips and drain dressing c/d/i, DESTINY drain w/ serosanguineous output  Abdomen: soft, nontender, nondistended  Extremities: normal strength, FROM, no deformities    --------------------------------------------------------------------------------------      INS AND OUTS:    10-14-24 @ 07:01  -  10-15-24 @ 07:00  --------------------------------------------------------  IN: 900 mL / OUT: 690 mL / NET: 210 mL        --------------------------------------------------------------------------------------

## 2024-10-15 NOTE — DISCHARGE NOTE NURSING/CASE MANAGEMENT/SOCIAL WORK - NSDCPEFALRISK_GEN_ALL_CORE
For information on Fall & Injury Prevention, visit: [email or web address] OR  [email or web address] OR  https://www.cdc.gov/steadi/patient.html

## 2024-10-15 NOTE — DIETITIAN INITIAL EVALUATION ADULT - REASON FOR ADMISSION
Nontoxic goiter    Per chart review, 59 year old female with a past medical history of depression, HLD, Hypertension, and DM (A1c of 8.4% on 10/7/24) who presents with nontoxic goiter and is now s/p Left Substernal Thyroid Lobectomy with Isthmusectomy and RLN monitoring.

## 2024-10-15 NOTE — DIETITIAN INITIAL EVALUATION ADULT - ORAL INTAKE PTA/DIET HISTORY
Patient reports good appetite and po intake. NKFA. Unable to report sources of carbohydrate and unable to count carbohydrate at baseline.

## 2024-10-15 NOTE — DIETITIAN INITIAL EVALUATION ADULT - OTHER INFO
Patient reports good tolerance to regular diet, consuming >75% of breakfast. Patient denies any nausea/vomiting/diarrhea/constipation or difficulty chewing and swallowing. No bowel movement since admission. Denies any weight changes despite indication for consult. Per [facility], 2/22/24-  80.7kg/177.54lbs and now 10/15/24- 78.9kg/173.9lbs indicative of 4lbs insignificant weight loss over 8months.  Patient reports good tolerance to regular diet, consuming >75% of breakfast. Patient denies any nausea/vomiting/diarrhea/constipation or difficulty chewing and swallowing. No bowel movement since admission. Denies any weight changes despite indication for consult. Per [facility], 2/22/24-  80.7kg/177.54lbs and now 10/15/24- 78.9kg/173.9lbs indicative of 4lbs insignificant weight loss over 8months. Plan for d/c today. Patient voices interest to see outpatient RD upon discharge for ongoing nutrition education.

## 2024-10-15 NOTE — DIETITIAN INITIAL EVALUATION ADULT - EDUCATION DIETARY MODIFICATIONS
RD provided the patient with extensive verbal and written DM diet education; including, sources of carbohydrate, carb counting, label reading, DM myplate method, portion control, meal planning, pre-prandial and post-prandial finger stick goals, and HbA1c goal. Patient was also made aware of the physiological implications of poor glycemic control./teach back/(1) partially meets; needs review/practice/verbalization

## 2024-10-15 NOTE — PROGRESS NOTE ADULT - SUBJECTIVE AND OBJECTIVE BOX
ANESTHESIA POSTOP CHECK    59y Female POSTOP DAY 1 S/P Left Substernal lobectomy    Vital Signs Last 24 Hrs  T(C): 36.9 (15 Oct 2024 09:45), Max: 37.1 (14 Oct 2024 22:00)  T(F): 98.4 (15 Oct 2024 09:45), Max: 98.8 (14 Oct 2024 22:00)  HR: 89 (15 Oct 2024 09:45) (70 - 89)  BP: 130/74 (15 Oct 2024 09:45) (120/74 - 139/87)  BP(mean): 98 (14 Oct 2024 16:00) (96 - 103)  RR: 17 (15 Oct 2024 09:45) (15 - 18)  SpO2: 100% (15 Oct 2024 09:45) (94% - 100%)    Parameters below as of 15 Oct 2024 09:45  Patient On (Oxygen Delivery Method): room air      I&O's Summary    14 Oct 2024 07:01  -  15 Oct 2024 07:00  --------------------------------------------------------  IN: 900 mL / OUT: 690 mL / NET: 210 mL        [x] NO APPARENT ANESTHESIA COMPLICATIONS      Comments:

## 2024-10-18 LAB — SURGICAL PATHOLOGY STUDY: SIGNIFICANT CHANGE UP

## 2024-10-24 ENCOUNTER — APPOINTMENT (OUTPATIENT)
Dept: SURGERY | Facility: CLINIC | Age: 59
End: 2024-10-24
Payer: COMMERCIAL

## 2024-10-24 DIAGNOSIS — E04.9 NONTOXIC GOITER, UNSPECIFIED: ICD-10-CM

## 2024-10-24 PROBLEM — E11.9 TYPE 2 DIABETES MELLITUS WITHOUT COMPLICATIONS: Chronic | Status: ACTIVE | Noted: 2024-10-07

## 2024-10-24 PROBLEM — E78.5 HYPERLIPIDEMIA, UNSPECIFIED: Chronic | Status: ACTIVE | Noted: 2024-10-07

## 2024-10-24 PROBLEM — I10 ESSENTIAL (PRIMARY) HYPERTENSION: Chronic | Status: ACTIVE | Noted: 2024-10-07

## 2024-10-24 PROBLEM — F32.A DEPRESSION, UNSPECIFIED: Chronic | Status: ACTIVE | Noted: 2024-10-07

## 2024-10-24 PROCEDURE — 99024 POSTOP FOLLOW-UP VISIT: CPT

## 2024-11-26 ENCOUNTER — APPOINTMENT (OUTPATIENT)
Dept: SURGERY | Facility: CLINIC | Age: 59
End: 2024-11-26
Payer: COMMERCIAL

## 2024-11-26 DIAGNOSIS — E04.9 NONTOXIC GOITER, UNSPECIFIED: ICD-10-CM

## 2024-11-26 PROCEDURE — 99024 POSTOP FOLLOW-UP VISIT: CPT

## 2024-11-26 PROCEDURE — 36415 COLL VENOUS BLD VENIPUNCTURE: CPT

## 2024-11-27 ENCOUNTER — NON-APPOINTMENT (OUTPATIENT)
Age: 59
End: 2024-11-27

## 2024-11-27 LAB
T3 SERPL-MCNC: 98 NG/DL
T4 FREE SERPL-MCNC: 0.9 NG/DL
THYROGLOB AB SERPL-ACNC: <15 IU/ML
THYROPEROXIDASE AB SERPL IA-ACNC: <9 IU/ML
TSH SERPL-ACNC: 2.61 UIU/ML

## 2025-03-20 ENCOUNTER — APPOINTMENT (OUTPATIENT)
Dept: SURGERY | Facility: CLINIC | Age: 60
End: 2025-03-20

## (undated) DEVICE — PREP BETADINE KIT

## (undated) DEVICE — BIPOLAR FORCEP CORD WECK STANDARD 12FT

## (undated) DEVICE — SOL IRR POUR H2O 1500ML

## (undated) DEVICE — SUT VICRYL 3-0 18" SH (POP-OFF)

## (undated) DEVICE — ELCTR BOVIE PENCIL SMOKE EVACUATION

## (undated) DEVICE — SUT VICRYL 2-0 27" SH UNDYED

## (undated) DEVICE — CANISTER DISPOSABLE THIN WALL 3000CC

## (undated) DEVICE — ELCTR MONOPOLAR STIMULATOR PROBE FLUSH-TIP

## (undated) DEVICE — DRAPE 3/4 SHEET 52X76"

## (undated) DEVICE — DRAIN JACKSON PRATT 7MM FLAT FULL NO TROCAR

## (undated) DEVICE — LAP PAD W RING 18 X 18"

## (undated) DEVICE — DRAPE LAPAROTOMY TRANSVERSE

## (undated) DEVICE — SUT CHROMIC 3-0 27" SH

## (undated) DEVICE — SUT PROLENE 0 30" CT-1

## (undated) DEVICE — SUT VICRYL PLUS 2-0 18" TIES UNDYED

## (undated) DEVICE — DRAIN RESERVOIR FOR JACKSON PRATT 100CC CARDINAL

## (undated) DEVICE — SAFETY PIN

## (undated) DEVICE — PACK HEAD & NECK

## (undated) DEVICE — PROTECTOR HEEL / ELBOW FLUFFY

## (undated) DEVICE — SOL IRR POUR H2O 500ML

## (undated) DEVICE — DRSG BENZOIN 0.6CC

## (undated) DEVICE — SUT VICRYL 3-0 18" TIES UNDYED

## (undated) DEVICE — LABELS BLANK W PEN

## (undated) DEVICE — ELCTR GROUNDING PAD ADULT COVIDIEN

## (undated) DEVICE — SUT MONOCRYL 4-0 27" PS-2 UNDYED

## (undated) DEVICE — SUT SILK 2-0 18" FS

## (undated) DEVICE — VENODYNE/SCD SLEEVE CALF MEDIUM

## (undated) DEVICE — FORCEP SEMKIN W/CORD 5.5IN STRL

## (undated) DEVICE — GLV 7 PROTEXIS (WHITE)